# Patient Record
Sex: MALE | Race: BLACK OR AFRICAN AMERICAN | Employment: FULL TIME | URBAN - METROPOLITAN AREA
[De-identification: names, ages, dates, MRNs, and addresses within clinical notes are randomized per-mention and may not be internally consistent; named-entity substitution may affect disease eponyms.]

---

## 2021-07-30 ENCOUNTER — HOSPITAL ENCOUNTER (EMERGENCY)
Age: 46
Discharge: HOME | End: 2021-07-30
Attending: EMERGENCY MEDICINE

## 2021-07-30 VITALS
DIASTOLIC BLOOD PRESSURE: 74 MMHG | TEMPERATURE: 99.2 F | SYSTOLIC BLOOD PRESSURE: 129 MMHG | RESPIRATION RATE: 16 BRPM | OXYGEN SATURATION: 98 % | HEART RATE: 95 BPM

## 2021-07-30 DIAGNOSIS — H10.213 CHEMICAL CONJUNCTIVITIS OF BOTH EYES: Primary | ICD-10-CM

## 2021-07-30 PROCEDURE — 99282 EMERGENCY DEPT VISIT SF MDM: CPT

## 2021-07-30 PROCEDURE — 74011000250 HC RX REV CODE- 250: Performed by: NURSE PRACTITIONER

## 2021-07-30 RX ORDER — POLYMYXIN B SULFATE AND TRIMETHOPRIM 1; 10000 MG/ML; [USP'U]/ML
1 SOLUTION OPHTHALMIC EVERY 6 HOURS
Qty: 1 BOTTLE | Refills: 0 | Status: SHIPPED | OUTPATIENT
Start: 2021-07-30 | End: 2021-08-06

## 2021-07-30 RX ORDER — PROPARACAINE HYDROCHLORIDE 5 MG/ML
1 SOLUTION/ DROPS OPHTHALMIC
Status: COMPLETED | OUTPATIENT
Start: 2021-07-30 | End: 2021-07-30

## 2021-07-30 RX ADMIN — FLUORESCEIN SODIUM 1 STRIP: 0.6 STRIP OPHTHALMIC at 13:47

## 2021-07-30 RX ADMIN — PROPARACAINE HYDROCHLORIDE 1 DROP: 5 SOLUTION/ DROPS OPHTHALMIC at 13:47

## 2021-07-30 NOTE — Clinical Note
[address] [place] [address] [facility]  [id number] [facility] [address] [address] [phone]    Work/School Note    Date: 7/30/2021    To Whom It May concern:    Woodrow Bosch was seen and treated today in the emergency room by the following provider(s):  Attending Provider: Misty Yadva MD  Nurse Practitioner: Adams Gottron, FNP. Woodrow Bosch is excused from work/school on 07/30/21 and 07/31/21. He is medically clear to return to work/school on 8/1/2021.        Sincerely,          RISA Day
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
## 2021-07-30 NOTE — ED TRIAGE NOTES
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 He worked outside today and his eyes got worse.

## 2021-10-04 ENCOUNTER — APPOINTMENT (OUTPATIENT)
Dept: GENERAL RADIOLOGY | Age: 46
End: 2021-10-04
Attending: EMERGENCY MEDICINE

## 2021-10-04 ENCOUNTER — HOSPITAL ENCOUNTER (EMERGENCY)
Age: 46
Discharge: HOME | End: 2021-10-04
Attending: EMERGENCY MEDICINE

## 2021-10-04 VITALS
HEIGHT: 67 IN | BODY MASS INDEX: 22.76 KG/M2 | TEMPERATURE: 98.5 F | WEIGHT: 145 LBS | SYSTOLIC BLOOD PRESSURE: 111 MMHG | RESPIRATION RATE: 18 BRPM | HEART RATE: 94 BPM | DIASTOLIC BLOOD PRESSURE: 71 MMHG | OXYGEN SATURATION: 98 %

## 2021-10-04 DIAGNOSIS — S60.221A CONTUSION OF RIGHT HAND, INITIAL ENCOUNTER: Primary | ICD-10-CM

## 2021-10-04 PROCEDURE — 73130 X-RAY EXAM OF HAND: CPT

## 2021-10-04 PROCEDURE — 99283 EMERGENCY DEPT VISIT LOW MDM: CPT

## 2021-10-04 RX ORDER — IBUPROFEN 600 MG/1
600 TABLET ORAL
Qty: 20 TABLET | Refills: 0 | Status: SHIPPED | OUTPATIENT
Start: 2021-10-04
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
## 2021-10-04 NOTE — ED TRIAGE NOTES
Patient c/o swelling to right hand. He states that he slammed right hand into a door yesterday. Mild swelling noted to area proximal to inner aspect of hand.

## 2021-10-04 NOTE — Clinical Note
[address] [place] [address] [facility]  [address] [facility] [address] [address] [phone]    Work/School Note    Date: 10/4/2021    To Whom It May concern:      Radha Felipe was seen and treated today in the emergency room by the following provider(s):  Attending Provider: Bia Rogers MD  Physician Assistant: LEAH Nicolas. Radha Felipe is excused from work/school on 10/04/21. He is clear to return to work/school on 10/05/21.         Sincerely,          LEAH Crockett